# Patient Record
Sex: FEMALE | NOT HISPANIC OR LATINO
[De-identification: names, ages, dates, MRNs, and addresses within clinical notes are randomized per-mention and may not be internally consistent; named-entity substitution may affect disease eponyms.]

---

## 2017-05-17 ENCOUNTER — TRANSCRIPTION ENCOUNTER (OUTPATIENT)
Age: 52
End: 2017-05-17

## 2019-12-16 ENCOUNTER — TRANSCRIPTION ENCOUNTER (OUTPATIENT)
Age: 54
End: 2019-12-16

## 2022-04-11 PROBLEM — Z00.00 ENCOUNTER FOR PREVENTIVE HEALTH EXAMINATION: Status: ACTIVE | Noted: 2022-04-11

## 2022-05-09 ENCOUNTER — APPOINTMENT (OUTPATIENT)
Dept: GASTROENTEROLOGY | Facility: CLINIC | Age: 57
End: 2022-05-09

## 2022-05-17 ENCOUNTER — APPOINTMENT (OUTPATIENT)
Dept: GASTROENTEROLOGY | Facility: CLINIC | Age: 57
End: 2022-05-17
Payer: COMMERCIAL

## 2022-05-17 ENCOUNTER — NON-APPOINTMENT (OUTPATIENT)
Age: 57
End: 2022-05-17

## 2022-05-17 VITALS
BODY MASS INDEX: 21.35 KG/M2 | HEIGHT: 62 IN | OXYGEN SATURATION: 98 % | HEART RATE: 76 BPM | SYSTOLIC BLOOD PRESSURE: 122 MMHG | WEIGHT: 116 LBS | DIASTOLIC BLOOD PRESSURE: 75 MMHG | TEMPERATURE: 97.9 F

## 2022-05-17 DIAGNOSIS — J45.909 UNSPECIFIED ASTHMA, UNCOMPLICATED: ICD-10-CM

## 2022-05-17 DIAGNOSIS — Z12.11 ENCOUNTER FOR SCREENING FOR MALIGNANT NEOPLASM OF COLON: ICD-10-CM

## 2022-05-17 PROCEDURE — 99203 OFFICE O/P NEW LOW 30 MIN: CPT

## 2022-05-17 NOTE — ASSESSMENT
[FreeTextEntry1] : Encounter for screening colonoscopy\par - Schedule with Dr. Winston\par - Explained the risks (including but not limited to cardiopulmonary anesthetic complications, bleeding, perforation, aspiration, missed lesions and misidentified sites of lesions - complications that might necessitate hospitalization or surgery), benefits and alternatives of colonoscopy were reviewed with the patient. Preparation for the procedure was reviewed with the patient. The patient was informed that she/he would be given intravenous anesthesia by an anesthesiologist for the procedure. The patient was informed that a family member or friend must drive the patient following recovery from the procedure. Patient understands and agrees, all questions answered.\par \par

## 2022-05-17 NOTE — HISTORY OF PRESENT ILLNESS
[FreeTextEntry1] : Ms. CR MORILLO is a 56 year old female with a PMH of asthma (PRN ventolin). \par Presents for screening colonoscopy.\par Neighbor recommended Dr. Winston.\par No prior colonoscopy. \par Denies change in bowel habits. \par Denies constipation, N/V/D, rectal bleeding, abdominal pain, bloating, unintentional weight loss, early satiety, heartburn or reflux.  \par Denies bloody or black stools. \par BM every 1-2 days. \par Soft, brown stools.  \par Denies family history of colon cancer.\par Father gets colon polyps and has a colonoscopy every couple years.\par Never smoker, does not drink alcohol, no illicit drug use. \par Diet/exercise: low carb, exercises 6 days a week. \par \par

## 2022-05-17 NOTE — PHYSICAL EXAM
[General Appearance - Alert] : alert [General Appearance - In No Acute Distress] : in no acute distress [Sclera] : the sclera and conjunctiva were normal [PERRL With Normal Accommodation] : pupils were equal in size, round, and reactive to light [Abdomen Soft] : soft [Abdomen Tenderness] : non-tender [Oriented To Time, Place, And Person] : oriented to person, place, and time

## 2022-05-17 NOTE — REVIEW OF SYSTEMS
[Fever] : no fever [Chills] : no chills [Abdominal Pain] : no abdominal pain [Vomiting] : no vomiting [Constipation] : no constipation [Diarrhea] : no diarrhea [Heartburn] : no heartburn [Melena] : no melena

## 2022-06-07 ENCOUNTER — APPOINTMENT (OUTPATIENT)
Dept: GASTROENTEROLOGY | Facility: CLINIC | Age: 57
End: 2022-06-07

## 2022-07-09 ENCOUNTER — RESULT REVIEW (OUTPATIENT)
Age: 57
End: 2022-07-09

## 2022-09-10 ENCOUNTER — RESULT REVIEW (OUTPATIENT)
Age: 57
End: 2022-09-10

## 2022-09-12 ENCOUNTER — APPOINTMENT (OUTPATIENT)
Dept: GASTROENTEROLOGY | Facility: HOSPITAL | Age: 57
End: 2022-09-12